# Patient Record
(demographics unavailable — no encounter records)

---

## 2025-06-02 NOTE — PROCEDURE
[de-identified] :   - Procedure Note   Pre-operative Diagnosis: Dysphonia Post-operative Diagnosis: Bilateral vocal fold lesion, LPR Anesthesia: Topical - 1 % Lidocaine/Phenylephrine Procedure:  Flexible Laryngoscopy with Stroboscopy - [facility] [id number]   Procedure Details:  The patient was placed in the sitting position.  After decongestant and anesthesia were applied the laryngoscope was passed.  The nasal cavities, nasopharynx, oropharynx, hypopharynx, and larynx were all examined.  Vocal folds were examined during respiration and phonation.  The following findings were noted:   Findings:  Nose: Septum is midline, turbinates are normal, nasal airways patent, mucosa normal Nasopharynx: Adenoids normal, no masses, eustachian tube normal Oropharynx: Pharyngeal walls symmetric and without lesion. Tonsils/fossae symmetric Hypopharynx: Hypopharynx and pyriform sinuses without lesion. No masses or asymmetry.  No pooling of secretions. Larynx:  Epiglottis and aryepiglottic folds were sharp and crisp bilaterally.  Bilateral false vocal folds normal appearance. Airway was widely patent.  + Postcricoid edema   Strobe Exam Ratings    TVF Appearance:  bilateral vocal fold lesions, anterior one third likely nodule, evidence of pseudo sulcus TVF Mobility: normal Edema/hypertrophy: normal, evidence of pseudo sulcus Mucus on TVF: normal Glottic Closure: Hourglass gap  Mucosal Wave: Reduced Amplitude of Vibration: Reduced Phase: symmetric Supraglottic Hyperfunction: none Other Findings: none   Condition: Stable.  Patient tolerated procedure well.   Complications: None   --------------------------------------------------------------------   Procedure: Pharyngeal and speech evaluation, by cine or video - CPT 55467  Voice assessment was recorded via video recording on this date.   Clarity: Hoarse and raspy Range: Reduced Pitch Control: Reduced Projection: normal Tremor: none Cough: none   Condition: Stable.  Patient tolerated procedure well. Complications: None

## 2025-06-02 NOTE — ASSESSMENT
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  Exam today shows bilateral additive mass lesions likely consistent with prenodules.  At this time I am recommending consultation with speech therapy for evaluation and management.  For now recommending relative voice rest.  Additionally, Based on history and physical exam, I do believe there is a component of laryngopharyngeal reflux.  At this time I am recommending dietary and behavioral change to reduce acid in the diet.  I am also recommending PPI as well famotidine for a 3 months trial.  - Consultation with speech therapy - Voice hygiene, relative voice rest - Dietary and behavioral modification to reduce acid reflux, handout given - Omeprazole qd as well as Famotidine qhs - Voice hygiene, increase hydration, sips of water throughout the day, avoid throat clearing - fu 3 mo

## 2025-06-02 NOTE — HISTORY OF PRESENT ILLNESS
[de-identified] : - 5/29/25 76 yo female who presents with hoarseness. She has a belting voice but feels that she has no upper register. Currently in a play, "[employer]."  8 shows a week.  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  Not currently singing.  There are some screaming scenes in the play as well.  No rehearsals.  Lots of interviews with significant voice demands. Speaking voice is mostly normal, but there is some raspiness at times.  Voice issues have been for 2 weeks.  She has put her self on vocal rest.  She knows not to whisper.  No issues chewing, eating or swallowing. Known asthma, but no breathing issues others.   Does not use an inhaler regularly.  Has not worked with a vocal  or a speech pathologist.  Diet is positive for several acid producing foods.  Adequate water intake.

## 2025-06-02 NOTE — HISTORY OF PRESENT ILLNESS
[de-identified] : - 5/29/25 74 yo female who presents with hoarseness. She has a belting voice but feels that she has no upper register. Currently in a play, "[employer]."  8 shows a week.  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  Not currently singing.  There are some screaming scenes in the play as well.  No rehearsals.  Lots of interviews with significant voice demands. Speaking voice is mostly normal, but there is some raspiness at times.  Voice issues have been for 2 weeks.  She has put her self on vocal rest.  She knows not to whisper.  No issues chewing, eating or swallowing. Known asthma, but no breathing issues others.   Does not use an inhaler regularly.  Has not worked with a vocal  or a speech pathologist.  Diet is positive for several acid producing foods.  Adequate water intake.

## 2025-06-02 NOTE — PROCEDURE
[de-identified] :   - Procedure Note   Pre-operative Diagnosis: Dysphonia Post-operative Diagnosis: Bilateral vocal fold lesion, LPR Anesthesia: Topical - 1 % Lidocaine/Phenylephrine Procedure:  Flexible Laryngoscopy with Stroboscopy - [facility] [id number]   Procedure Details:  The patient was placed in the sitting position.  After decongestant and anesthesia were applied the laryngoscope was passed.  The nasal cavities, nasopharynx, oropharynx, hypopharynx, and larynx were all examined.  Vocal folds were examined during respiration and phonation.  The following findings were noted:   Findings:  Nose: Septum is midline, turbinates are normal, nasal airways patent, mucosa normal Nasopharynx: Adenoids normal, no masses, eustachian tube normal Oropharynx: Pharyngeal walls symmetric and without lesion. Tonsils/fossae symmetric Hypopharynx: Hypopharynx and pyriform sinuses without lesion. No masses or asymmetry.  No pooling of secretions. Larynx:  Epiglottis and aryepiglottic folds were sharp and crisp bilaterally.  Bilateral false vocal folds normal appearance. Airway was widely patent.  + Postcricoid edema   Strobe Exam Ratings    TVF Appearance:  bilateral vocal fold lesions, anterior one third likely nodule, evidence of pseudo sulcus TVF Mobility: normal Edema/hypertrophy: normal, evidence of pseudo sulcus Mucus on TVF: normal Glottic Closure: Hourglass gap  Mucosal Wave: Reduced Amplitude of Vibration: Reduced Phase: symmetric Supraglottic Hyperfunction: none Other Findings: none   Condition: Stable.  Patient tolerated procedure well.   Complications: None   --------------------------------------------------------------------   Procedure: Pharyngeal and speech evaluation, by cine or video - CPT 45062  Voice assessment was recorded via video recording on this date.   Clarity: Hoarse and raspy Range: Reduced Pitch Control: Reduced Projection: normal Tremor: none Cough: none   Condition: Stable.  Patient tolerated procedure well. Complications: None

## 2025-06-04 NOTE — HISTORY OF PRESENT ILLNESS
[de-identified] : - 5/29/25 74 yo female who presents with hoarseness. She has a belting voice but feels that she has no upper register. Currently in a play, "[employer]."  8 shows a week.  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  Not currently singing.  There are some screaming scenes in the play as well.  No rehearsals.  Lots of interviews with significant voice demands. Speaking voice is mostly normal, but there is some raspiness at times.  Voice issues have been for 2 weeks.  She has put her self on vocal rest.  She knows not to whisper.  No issues chewing, eating or swallowing. Known asthma, but no breathing issues others.   Does not use an inhaler regularly.  Has not worked with a vocal  or a speech pathologist.  Diet is positive for several acid producing foods.  Adequate water intake. -  [FreeTextEntry1] : 6/4/2025 Patient just picked up the antireflux medications and is working on a low acid diet though she did have blueberries this morning.  Has had significant voice demands both performing and doing interviews.  She notes that over the past few days her voice actually got worse.  She is now much more hoarse with a reduced range.  No issues chewing eating or swallowing.  Speech therapy visit is set up for later this week.  She does have important engagements over the next 1 week.

## 2025-06-04 NOTE — ASSESSMENT
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  Exam today shows bilateral additive mass lesions likely consistent with prenodules.  At this time I am recommending consultation with speech therapy for evaluation and management.  For now recommending relative voice rest.  Additionally, Based on history and physical exam, I do believe there is a component of laryngopharyngeal reflux.  At this time I am recommending dietary and behavioral change to reduce acid in the diet.  I am also recommending PPI as well famotidine for a 3 months trial.  6/4/2025 Patient unfortunately has acute laryngitis likely from overuse over the past few days.  Exam is unchanged.  Voice quality is hoarse and raspy.  This time I am recommending dexamethasone injection for acute relief as well as Medrol Dosepak to cover for this upcoming week.  Recommend continuing with the antireflux medications as described above.  Patient will also adhere to voice rest and voice hygiene the best she can and follow-up with her speech pathology team later this week.  She will keep me updated on how she is progressing.  - Dexamethasone injection today, left gluteus - Medrol Dosepak - Consultation with speech therapy - Voice hygiene, relative voice rest - Dietary and behavioral modification to reduce acid reflux, handout given - Omeprazole qd as well as Famotidine qhs - Voice hygiene, increase hydration, sips of water throughout the day, avoid throat clearing - fu 3 mo

## 2025-06-04 NOTE — PROCEDURE
[FreeTextEntry3] : - Intramuscular injection of Steroid (Dexamethasone)  PREOPERATIVE DIAGNOSIS: Chronic inflammation/Laryngitis POSTOPERATIVE DIAGNOSIS: same DATE OF PROCEDURE: 6/4/2025 NAME OF PROCEDURE:  Intramuscular Injection of Steroid;  CPT 63768,  SURGEON:  Cisco Polo MD ANESTHESIA: none ESTIMATED BLOOD LOSS: Zero. SPECIMENS: None.    INDICATIONS FOR SURGERY: This is a 75-year-old female with concern for dysphonia and found to have acute laryngitis.  We therefore discussed intramuscular injection of steroid.  We discussed risks, benefits, and alternatives of the surgery including possibility of worsened hearing, pain, discomfort, bleeding, sleep disruption, mental status change, hunger, and wished to go ahead with the procedure as planned.      DETAILS OF THE PROCEDURE: The patient was brought to the procedure room and I identified them.  The right gluteal muscle was exposed, and wiped cleaned with an alcohol wipe.  I grabbed the muscle and then injected 0.5ml of Dexamethasone (10mg/ml) into the gluteal muscle.  There was no evidence of bleeding.      The patient tolerated the procedure well and remained in stable condition.  I was present and participated in all key portions of this procedure and was immediately available throughout the entire case.  Dexamethasone 10mg/1ml 0.5cc used NDC#: 0059-9233-10 Lot#: [id number] Exp: January 27 [de-identified] :   - Procedure Note   Pre-operative Diagnosis: Dysphonia Post-operative Diagnosis: Bilateral vocal fold lesion, LPR Anesthesia: Topical - 1 % Lidocaine/Phenylephrine Procedure:  Flexible Laryngoscopy with Stroboscopy - [facility] [id number]   Procedure Details:  The patient was placed in the sitting position.  After decongestant and anesthesia were applied the laryngoscope was passed.  The nasal cavities, nasopharynx, oropharynx, hypopharynx, and larynx were all examined.  Vocal folds were examined during respiration and phonation.  The following findings were noted:   Findings:  Nose: Septum is midline, turbinates are normal, nasal airways patent, mucosa normal Nasopharynx: Adenoids normal, no masses, eustachian tube normal Oropharynx: Pharyngeal walls symmetric and without lesion. Tonsils/fossae symmetric Hypopharynx: Hypopharynx and pyriform sinuses without lesion. No masses or asymmetry.  No pooling of secretions. Larynx:  Epiglottis and aryepiglottic folds were sharp and crisp bilaterally.  Bilateral false vocal folds normal appearance. Airway was widely patent.  + Postcricoid edema   Strobe Exam Ratings    TVF Appearance:  bilateral vocal fold lesions, anterior one third likely nodule, evidence of pseudo sulcus TVF Mobility: normal Edema/hypertrophy: normal, evidence of pseudo sulcus Mucus on TVF: normal Glottic Closure: Hourglass gap  Mucosal Wave: Reduced Amplitude of Vibration: Reduced Phase: symmetric Supraglottic Hyperfunction: none Other Findings: none   Condition: Stable.  Patient tolerated procedure well.   Complications: None   --------------------------------------------------------------------   Procedure: Pharyngeal and speech evaluation, by cine or video - CPT 56819  Voice assessment was recorded via video recording on this date.   Clarity: Hoarse and raspy Range: Reduced Pitch Control: Reduced Projection: normal Tremor: none Cough: none   Condition: Stable.  Patient tolerated procedure well. Complications: None